# Patient Record
Sex: FEMALE | Race: WHITE | Employment: FULL TIME | URBAN - METROPOLITAN AREA
[De-identification: names, ages, dates, MRNs, and addresses within clinical notes are randomized per-mention and may not be internally consistent; named-entity substitution may affect disease eponyms.]

---

## 2021-05-21 ENCOUNTER — OFFICE VISIT (OUTPATIENT)
Dept: CARDIOLOGY CLINIC | Age: 41
End: 2021-05-21
Payer: COMMERCIAL

## 2021-05-21 VITALS
DIASTOLIC BLOOD PRESSURE: 82 MMHG | BODY MASS INDEX: 36.82 KG/M2 | SYSTOLIC BLOOD PRESSURE: 140 MMHG | OXYGEN SATURATION: 99 % | HEIGHT: 65 IN | HEART RATE: 68 BPM | WEIGHT: 221 LBS

## 2021-05-21 DIAGNOSIS — I44.7 LBBB (LEFT BUNDLE BRANCH BLOCK): Primary | ICD-10-CM

## 2021-05-21 DIAGNOSIS — R07.9 CHEST PAIN, UNSPECIFIED TYPE: ICD-10-CM

## 2021-05-21 DIAGNOSIS — I44.7 LBBB (LEFT BUNDLE BRANCH BLOCK): ICD-10-CM

## 2021-05-21 DIAGNOSIS — I10 ESSENTIAL HYPERTENSION: ICD-10-CM

## 2021-05-21 DIAGNOSIS — E66.9 CLASS 2 OBESITY WITHOUT SERIOUS COMORBIDITY WITH BODY MASS INDEX (BMI) OF 36.0 TO 36.9 IN ADULT, UNSPECIFIED OBESITY TYPE: ICD-10-CM

## 2021-05-21 PROCEDURE — 93000 ELECTROCARDIOGRAM COMPLETE: CPT | Performed by: INTERNAL MEDICINE

## 2021-05-21 PROCEDURE — 99204 OFFICE O/P NEW MOD 45 MIN: CPT | Performed by: INTERNAL MEDICINE

## 2021-05-21 RX ORDER — METFORMIN HYDROCHLORIDE 500 MG/1
500 TABLET ORAL 2 TIMES DAILY
COMMUNITY
Start: 2021-04-13

## 2021-05-21 RX ORDER — METOPROLOL SUCCINATE 50 MG/1
50 TABLET, EXTENDED RELEASE ORAL DAILY
COMMUNITY
Start: 2021-05-14

## 2021-05-21 RX ORDER — AMLODIPINE BESYLATE 5 MG/1
5 TABLET ORAL DAILY
COMMUNITY
Start: 2021-05-10

## 2021-05-21 RX ORDER — FLUOXETINE HYDROCHLORIDE 40 MG/1
40 CAPSULE ORAL DAILY
COMMUNITY
Start: 2021-04-14

## 2021-05-21 NOTE — PROGRESS NOTES
HISTORY OF PRESENT ILLNESS  Kylee Baugh is a 47 y.o. female. HPI    Patient presents for a new office visit. She was referred here from the emergency room for evaluation of a new left bundle branch block. 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 She has been complaining of increased headaches. She was also complaining of substernal chest pain and was noted to have a fast heart rate by her PCP, so she was sent directly to the emergency room approximately a week and a half ago. Her initial EKG at that time demonstrated sinus tachycardia with a left bundle branch block. Cardiac biomarkers were all normal.  Her blood pressure was elevated, so she was started on amlodipine and metoprolol. She was also taking phentermine at that time and was instructed to stop this medication. She was discharged home, but still was having intermittent chest discomfort the following day and became quite anxious, so she decided to seek medical attention at the emergency room at [facility] where she was again ruled out for a myocardial infarction and ultimately underwent a CTA of her coronary anatomy which was normal.  She had no evidence of epicardial coronary disease with a total calcium score of 0. She was ultimately discharged home with instructions for outpatient cardiology follow-up. Since her last emergency room visit 10 days ago, she states she has been feeling better. Her blood pressure has been averaging 073R to 445Z systolic at home. Her heart palpitations have improved as has her chest pain. She denies any leg swelling, no orthopnea, no PND.     Past Medical History:   Diagnosis Date    Anxiety     Diabetes ([facility] [address]     Essential hypertension     Left bundle branch block (LBBB) determined by electrocardiography 05/2021     Current Outpatient Medications   Medication Sig Dispense Refill    amLODIPine (NORVASC) 5 mg tablet Take 5 mg by mouth daily.  metoprolol succinate (TOPROL-XL) 50 mg XL tablet Take 50 mg by mouth daily.  metFORMIN (GLUCOPHAGE) 500 mg tablet Take 500 mg by mouth two (2) times a day. Take 1 tablet by mouth in the AM and 2 tablets by mouth in the PM      FLUoxetine (PROzac) 40 mg capsule Take 40 mg by mouth daily. Allergies   Allergen Reactions    Zithromax [Azithromycin] Hives      Social History     Tobacco Use    Smoking status: Never Smoker    Smokeless tobacco: Never Used   Substance Use Topics    Alcohol use: Not Currently    Drug use: Never     Family History   Problem Relation Age of Onset    Hypertension Mother     Atrial Fibrillation Mother     Diabetes Mother     Hypertension Father     Heart Surgery Father     Diabetes Father     High Cholesterol Father              Review of Systems   Constitutional: Negative for chills, fever and weight loss. HENT: Negative for nosebleeds. Eyes: Negative for blurred vision and double vision. Respiratory: Negative for cough, shortness of breath and wheezing. Cardiovascular: Positive for chest pain. Negative for palpitations, orthopnea, claudication, leg swelling and PND. Gastrointestinal: Negative for abdominal pain, heartburn, nausea and vomiting. Genitourinary: Negative for dysuria and hematuria. Musculoskeletal: Negative for falls and myalgias. Skin: Negative for rash. Neurological: Negative for dizziness, focal weakness and headaches. Endo/Heme/Allergies: Does not bruise/bleed easily. Psychiatric/Behavioral: Negative for substance abuse. Visit Vitals  BP (!) 140/82 (BP 1 Location: Right upper arm, BP Patient Position: Sitting, BP Cuff Size: Small adult)   Pulse 68   Ht 5' 5\" (1.651 m)   Wt 100.2 kg (221 lb)   SpO2 99%   BMI 36.78 kg/m²       Physical Exam  Constitutional:       Appearance: She is well-developed.    HENT:      Head: Normocephalic and atraumatic. Eyes:      Conjunctiva/sclera: Conjunctivae normal.   Neck:      Vascular: No carotid bruit or JVD. Cardiovascular:      Rate and Rhythm: Normal rate and regular rhythm. Pulses: Normal pulses. Heart sounds: S1 normal and S2 normal. No murmur heard. No gallop. Pulmonary:      Effort: Pulmonary effort is normal.      Breath sounds: Normal breath sounds. No wheezing or rales. Abdominal:      General: Bowel sounds are normal. There is no distension. Palpations: Abdomen is soft. Tenderness: There is no abdominal tenderness. Musculoskeletal:      Cervical back: Neck supple. Skin:     General: Skin is warm and dry. Neurological:      General: No focal deficit present. Mental Status: She is alert and oriented to person, place, and time. Motor: No weakness. Psychiatric:         Mood and Affect: Mood normal.         Thought Content: Thought content normal.       EKG: Normal sinus rhythm, left axis deviation, left bundle branch block. Compared to the previous EKG, the heart rate has decreased. Left bundle branch block appears to be unchanged. ASSESSMENT and PLAN    Left bundle branch block. Recent diagnosis earlier this month at a PCP office visit. She underwent a CTA of her coronaries which was negative for epicardial coronary disease in May 2021. She was ruled out for myocardial infarction at a recent emergency room visit and had unremarkable chest x-ray. I suspect this may just be a conduction issue, but I would like to evaluate for any structural heart disease with an echocardiogram.    Chest pain. This may have been related to poorly controlled hypertension and demand ischemia. This has since resolved with the addition of amlodipine and metoprolol XL to her regimen. She underwent a normal CTA of her coronaries earlier this month. No additional work-up for this needed. Essential hypertension.   Patient's blood pressure is mildly elevated today in our office. She was recently started on amlodipine and metoprolol XL. I would continue these medications for now and have advised her to adhere to a low-sodium diet and try and lose weight with lifestyle modification. If her blood pressure remains elevated, would consider adding an ARB next to her regimen. Borderline diabetes mellitus, type II. Patient has been started on Metformin recently. This is followed by her PCP. Obesity. Patient reports a 15 to 20 pound weight gain over the past 6 months. She was strongly encouraged to try and lose much weight as possible lifestyle modification. Follow-up in 3 months, sooner if needed.

## 2021-05-21 NOTE — PROGRESS NOTES
Karuna Garza presents today for   Chief Complaint   Patient presents with    New Patient     Self Ref for Elevated BP   [facility] Follow Up     Went to [facility] on 05-10-21 for LBBB and [facility] 05-11-21 to 05-12-21 for Chest Pain       Karuna Garza preferred language for health care discussion is english/other. Is someone accompanying this pt? no    Is the patient using any DME equipment during [address]? no    Depression Screening:  3 most recent PHQ Screens 5/21/2021   Little interest or pleasure in doing things Not at all   Feeling down, depressed, irritable, or hopeless Not at all   Total Score PHQ 2 0       Learning Assessment:  Learning Assessment 5/21/2021   PRIMARY LEARNER Patient   PRIMARY LANGUAGE ENGLISH   LEARNER PREFERENCE PRIMARY DEMONSTRATION   ANSWERED BY patient   RELATIONSHIP SELF       Abuse Screening:  Abuse Screening Questionnaire 5/21/2021   Do you ever feel afraid of your partner? N   Are you in a relationship with someone who physically or mentally threatens you? N   Is it safe for you to go home? Y         Pt currently taking Anticoagulant therapy? no    Pt currently taking Antiplatelet therapy ? no      Coordination of Care:  1. Have you been to the ER, urgent care clinic since your last visit? Hospitalized since your last visit? yes    2. Have you seen or consulted any other health care providers outside of the [address] [place] [address] since your last visit? Include any pap smears or colon screening.  no

## 2021-06-28 LAB
ECHO AO ROOT DIAM: 2.95 CM
ECHO LA AREA 4C: 17.08 CM2
ECHO LA VOL 2C: 52.02 ML (ref 22–52)
ECHO LA VOL 4C: 43.68 ML (ref 22–52)
ECHO LA VOL BP: 52.01 ML (ref 22–52)
ECHO LA VOL/BSA BIPLANE: 25.25 ML/M2 (ref 16–28)
ECHO LA VOLUME INDEX A2C: 25.25 ML/M2 (ref 16–28)
ECHO LA VOLUME INDEX A4C: 21.2 ML/M2 (ref 16–28)
ECHO LV E' LATERAL VELOCITY: 10.2 CM/S
ECHO LV E' SEPTAL VELOCITY: 7.89 CM/S
ECHO LV INTERNAL DIMENSION DIASTOLIC: 4.6 CM (ref 3.9–5.3)
ECHO LV INTERNAL DIMENSION SYSTOLIC: 3.27 CM
ECHO LV IVSD: 1.12 CM (ref 0.6–0.9)
ECHO LV MASS 2D: 180.1 G (ref 67–162)
ECHO LV MASS INDEX 2D: 87.4 G/M2 (ref 43–95)
ECHO LV POSTERIOR WALL DIASTOLIC: 1.07 CM (ref 0.6–0.9)
ECHO LVOT DIAM: 1.83 CM
ECHO LVOT PEAK GRADIENT: 6.09 MMHG
ECHO LVOT PEAK VELOCITY: 123.42 CM/S
ECHO LVOT SV: 66.2 ML
ECHO LVOT VTI: 25.2 CM
ECHO MV A VELOCITY: 68.4 CM/S
ECHO MV E DECELERATION TIME (DT): 216.88 MS
ECHO MV E VELOCITY: 82.64 CM/S
ECHO MV E/A RATIO: 1.21
ECHO MV E/E' LATERAL: 8.1
ECHO MV E/E' RATIO (AVERAGED): 9.29
ECHO MV E/E' SEPTAL: 10.47
LVOT MG: 4 MMHG

## 2021-06-29 NOTE — PROGRESS NOTES
Per your last note - Left bundle branch block. Recent diagnosis earlier this month at a PCP office visit. She underwent a CTA of her coronaries which was negative for epicardial coronary disease in May 2021. She was ruled out for myocardial infarction at a recent emergency room visit and had unremarkable chest x-ray.   I suspect this may just be a conduction issue, but I would like to evaluate for any structural heart disease with an echocardiogram.

## 2021-07-06 ENCOUNTER — TELEPHONE (OUTPATIENT)
Dept: CARDIOLOGY CLINIC | Age: 41
End: 2021-07-06

## 2021-07-06 NOTE — TELEPHONE ENCOUNTER
----- Message from Dominga Malave MD sent at 7/6/2021  9:05 AM EDT -----  Please let the patient know that her echocardiogram was normal.  ----- Message -----  From: Ronal Valdez LPN  Sent: 8/06/4687   4:11 PM EDT  To: Dominga Malave MD    Per your last note - Left bundle branch block. Recent diagnosis earlier this month at a PCP office visit. She underwent a CTA of her coronaries which was negative for epicardial coronary disease in May 2021. She was ruled out for myocardial infarction at a recent emergency room visit and had unremarkable chest x-ray.   I suspect this may just be a conduction issue, but I would like to evaluate for any structural heart disease with an echocardiogram.

## 2021-07-06 NOTE — TELEPHONE ENCOUNTER
Patient made aware of test results and Dr. Kaveh Barreto remarks. No questions or concerns at present.

## 2021-08-23 ENCOUNTER — OFFICE VISIT (OUTPATIENT)
Dept: CARDIOLOGY CLINIC | Age: 41
End: 2021-08-23
Payer: COMMERCIAL

## 2021-08-23 VITALS
HEART RATE: 91 BPM | SYSTOLIC BLOOD PRESSURE: 134 MMHG | HEIGHT: 65 IN | OXYGEN SATURATION: 98 % | DIASTOLIC BLOOD PRESSURE: 72 MMHG | WEIGHT: 231 LBS | BODY MASS INDEX: 38.49 KG/M2

## 2021-08-23 DIAGNOSIS — I10 ESSENTIAL HYPERTENSION: ICD-10-CM

## 2021-08-23 DIAGNOSIS — I44.7 LBBB (LEFT BUNDLE BRANCH BLOCK): Primary | ICD-10-CM

## 2021-08-23 PROCEDURE — 99214 OFFICE O/P EST MOD 30 MIN: CPT | Performed by: INTERNAL MEDICINE

## 2021-08-23 NOTE — PROGRESS NOTES
HISTORY OF PRESENT ILLNESS  Jas Longo is a 47 y.o. female. Follow-up  Pertinent negatives include no chest pain, no abdominal pain, no headaches and no shortness of breath. Patient presents for a follow-up office visit. She was initially referred here from the emergency room for evaluation of a new left bundle branch block. 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 She has been complaining of increased headaches. She was also complaining of substernal chest pain and was noted to have a fast heart rate by her PCP, so she was sent directly to the emergency room approximately a week and a half ago. Her initial EKG at that time demonstrated sinus tachycardia with a left bundle branch block. Cardiac biomarkers were all normal.  Her blood pressure was elevated, so she was started on amlodipine and metoprolol. She was also taking phentermine at that time and was instructed to stop this medication. She was discharged home, but still was having intermittent chest discomfort the following day and became quite anxious, so she decided to seek medical attention at the emergency room at [facility] where she was again ruled out for a myocardial infarction and ultimately underwent a CTA of her coronary anatomy which was normal.  She had no evidence of epicardial coronary disease with a total calcium score of 0. She was ultimately discharged home with instructions for outpatient cardiology follow-up. The patient was last seen in our office 3 months ago. She underwent an echocardiogram in June 2021 which is essentially normal.  EF 55 to 60%, no valvular heart disease. Since last visit, she has been feeling well. No chest pain, shortness of breath, leg swelling, orthopnea or PND.   She does check her blood pressure at home and systolic readings are typically in the 937L 454O and diastolic readings in the 53C and 80s. Past Medical History:   Diagnosis Date    Anxiety     Diabetes ([facility] [address]     Essential hypertension     Left bundle branch block (LBBB) determined by electrocardiography 05/2021     Current Outpatient Medications   Medication Sig Dispense Refill    amLODIPine (NORVASC) 5 mg tablet Take 5 mg by mouth daily.  metoprolol succinate (TOPROL-XL) 50 mg XL tablet Take 50 mg by mouth daily.  metFORMIN (GLUCOPHAGE) 500 mg tablet Take 500 mg by mouth two (2) times a day. Take 1 tablet by mouth in the AM and 2 tablets by mouth in the PM      FLUoxetine (PROzac) 40 mg capsule Take 40 mg by mouth daily. Allergies   Allergen Reactions    Zithromax [Azithromycin] Hives      Social History     Tobacco Use    Smoking status: Never Smoker    Smokeless tobacco: Never Used   Substance Use Topics    Alcohol use: Not Currently    Drug use: Never     Family History   Problem Relation Age of Onset    Hypertension Mother     Atrial Fibrillation Mother     Diabetes Mother     Hypertension Father     Heart Surgery Father     Diabetes Father     High Cholesterol Father              Review of Systems   Constitutional: Negative for chills, fever and weight loss. HENT: Negative for nosebleeds. Eyes: Negative for blurred vision and double vision. Respiratory: Negative for cough, shortness of breath and wheezing. Cardiovascular: Negative for chest pain, palpitations, orthopnea, claudication, leg swelling and PND. Gastrointestinal: Negative for abdominal pain, heartburn, nausea and vomiting. Genitourinary: Negative for dysuria and hematuria. Musculoskeletal: Negative for falls and myalgias. Skin: Negative for rash. Neurological: Negative for dizziness, focal weakness and headaches. Endo/Heme/Allergies: Does not bruise/bleed easily. Psychiatric/Behavioral: Negative for substance abuse.      Visit Vitals  /72 (BP 1 Location: Left upper arm, BP Patient Position: Sitting, BP Cuff Size: Adult)   Pulse 91   Ht 5' 5\" (1.651 m)   Wt 104.8 kg (231 lb)   SpO2 98%   BMI 38.44 kg/m²       Physical Exam  Constitutional:       Appearance: She is well-developed. HENT:      Head: Normocephalic and atraumatic. Eyes:      Conjunctiva/sclera: Conjunctivae normal.   Neck:      Vascular: No carotid bruit or JVD. Cardiovascular:      Rate and Rhythm: Normal rate and regular rhythm. Pulses: Normal pulses. Heart sounds: S1 normal and S2 normal. No murmur heard. No gallop. Pulmonary:      Effort: Pulmonary effort is normal.      Breath sounds: Normal breath sounds. No wheezing or rales. Abdominal:      General: Bowel sounds are normal. There is no distension. Palpations: Abdomen is soft. Tenderness: There is no abdominal tenderness. Musculoskeletal:         General: No swelling or deformity. Cervical back: Neck supple. Skin:     General: Skin is warm and dry. Neurological:      General: No focal deficit present. Mental Status: She is alert and oriented to person, place, and time. Motor: No weakness. Psychiatric:         Mood and Affect: Mood normal.         Thought Content: Thought content normal.         ASSESSMENT and PLAN    Left bundle branch block. Recent diagnosis in May 2021 at her PCPs office. No evidence of structural heart disease on echocardiogram.  This is likely just electrical conduction phenomenon. She has no symptoms of advanced heart block. No additional work-up needed at this time. Essential hypertension. Patient's blood pressure now appears to be well controlled on her current medical regimen which currently includes metoprolol XL and amlodipine. This can be followed by her PCP. Borderline diabetes mellitus, type II. Patient has been started on Metformin recently. This is followed by her PCP. Obesity.   Patient has gained at least 20+ pounds in weight over the past year. She was strongly encouraged to try and lose much weight as possible lifestyle modification. Patient can follow-up in the future as needed.

## 2021-08-23 NOTE — PROGRESS NOTES
Daryl Quinones presents today for   Chief Complaint   Patient presents with    Follow-up     3 month follow up        Daryl Quinones preferred language for health care discussion is english/other. Is someone accompanying this pt? no    Is the patient using any DME equipment during [address]? no    Depression Screening:  3 most recent PHQ Screens 8/23/2021   Little interest or pleasure in doing things Not at all   Feeling down, depressed, irritable, or hopeless Not at all   Total Score PHQ 2 0       Learning Assessment:  Learning Assessment 5/21/2021   PRIMARY LEARNER Patient   PRIMARY LANGUAGE ENGLISH   LEARNER PREFERENCE PRIMARY DEMONSTRATION   ANSWERED BY patient   RELATIONSHIP SELF       Abuse Screening:  Abuse Screening Questionnaire 5/21/2021   Do you ever feel afraid of your partner? N   Are you in a relationship with someone who physically or mentally threatens you? N   Is it safe for you to go home? Y     Pt currently taking Anticoagulant therapy? no    Coordination of Care:  1. Have you been to the ER, urgent care clinic since your last visit? Hospitalized since your last visit? no    2. Have you seen or consulted any other health care providers outside of the [address] [place] [address] since your last visit? Include any pap smears or colon screening.  no

## 2022-03-18 PROBLEM — I44.7 LBBB (LEFT BUNDLE BRANCH BLOCK): Status: ACTIVE | Noted: 2021-05-21

## 2022-03-19 PROBLEM — E66.812 CLASS 2 OBESITY WITHOUT SERIOUS COMORBIDITY WITH BODY MASS INDEX (BMI) OF 36.0 TO 36.9 IN ADULT: Status: ACTIVE | Noted: 2021-05-21

## 2022-03-19 PROBLEM — E66.9 CLASS 2 OBESITY WITHOUT SERIOUS COMORBIDITY WITH BODY MASS INDEX (BMI) OF 36.0 TO 36.9 IN ADULT: Status: ACTIVE | Noted: 2021-05-21

## 2022-03-20 PROBLEM — I44.7 LEFT BUNDLE BRANCH BLOCK (LBBB) DETERMINED BY ELECTROCARDIOGRAPHY: Status: ACTIVE | Noted: 2021-05-01

## 2022-03-20 PROBLEM — I10 ESSENTIAL HYPERTENSION: Status: ACTIVE | Noted: 2021-05-21

## 2023-07-18 ENCOUNTER — OFFICE VISIT (OUTPATIENT)
Age: 43
End: 2023-07-18
Payer: COMMERCIAL

## 2023-07-18 VITALS
HEART RATE: 85 BPM | HEIGHT: 65 IN | DIASTOLIC BLOOD PRESSURE: 84 MMHG | WEIGHT: 248 LBS | OXYGEN SATURATION: 99 % | BODY MASS INDEX: 41.32 KG/M2 | SYSTOLIC BLOOD PRESSURE: 134 MMHG

## 2023-07-18 DIAGNOSIS — I44.7 LEFT BUNDLE-BRANCH BLOCK, UNSPECIFIED: ICD-10-CM

## 2023-07-18 DIAGNOSIS — I10 ESSENTIAL (PRIMARY) HYPERTENSION: Primary | ICD-10-CM

## 2023-07-18 DIAGNOSIS — E66.9 OBESITY, UNSPECIFIED CLASSIFICATION, UNSPECIFIED OBESITY TYPE, UNSPECIFIED WHETHER SERIOUS COMORBIDITY PRESENT: ICD-10-CM

## 2023-07-18 DIAGNOSIS — E11.9 TYPE 2 DIABETES MELLITUS WITHOUT COMPLICATION, WITHOUT LONG-TERM CURRENT USE OF INSULIN (HCC): ICD-10-CM

## 2023-07-18 PROBLEM — R07.9 CHEST PAIN: Status: ACTIVE | Noted: 2021-05-11

## 2023-07-18 PROBLEM — F33.42 RECURRENT MAJOR DEPRESSIVE DISORDER, IN FULL REMISSION (HCC): Status: ACTIVE | Noted: 2021-07-29

## 2023-07-18 PROBLEM — R73.03 PREDIABETES: Status: ACTIVE | Noted: 2021-07-29

## 2023-07-18 PROCEDURE — 99214 OFFICE O/P EST MOD 30 MIN: CPT | Performed by: INTERNAL MEDICINE

## 2023-07-18 PROCEDURE — 3075F SYST BP GE 130 - 139MM HG: CPT | Performed by: INTERNAL MEDICINE

## 2023-07-18 PROCEDURE — 93000 ELECTROCARDIOGRAM COMPLETE: CPT | Performed by: INTERNAL MEDICINE

## 2023-07-18 PROCEDURE — 3079F DIAST BP 80-89 MM HG: CPT | Performed by: INTERNAL MEDICINE

## 2023-07-18 RX ORDER — LISINOPRIL 5 MG/1
5 TABLET ORAL DAILY
COMMUNITY
End: 2023-07-18

## 2023-07-18 RX ORDER — LISINOPRIL 10 MG/1
10 TABLET ORAL DAILY
Qty: 90 TABLET | Refills: 3 | Status: SHIPPED | OUTPATIENT
Start: 2023-07-18

## 2023-07-18 ASSESSMENT — PATIENT HEALTH QUESTIONNAIRE - PHQ9
3. TROUBLE FALLING OR STAYING ASLEEP: 0
4. FEELING TIRED OR HAVING LITTLE ENERGY: 0
5. POOR APPETITE OR OVEREATING: 0
SUM OF ALL RESPONSES TO PHQ QUESTIONS 1-9: 0
SUM OF ALL RESPONSES TO PHQ9 QUESTIONS 1 & 2: 0
8. MOVING OR SPEAKING SO SLOWLY THAT OTHER PEOPLE COULD HAVE NOTICED. OR THE OPPOSITE, BEING SO FIGETY OR RESTLESS THAT YOU HAVE BEEN MOVING AROUND A LOT MORE THAN USUAL: 0
SUM OF ALL RESPONSES TO PHQ QUESTIONS 1-9: 0
SUM OF ALL RESPONSES TO PHQ QUESTIONS 1-9: 0
2. FEELING DOWN, DEPRESSED OR HOPELESS: 0
9. THOUGHTS THAT YOU WOULD BE BETTER OFF DEAD, OR OF HURTING YOURSELF: 0
6. FEELING BAD ABOUT YOURSELF - OR THAT YOU ARE A FAILURE OR HAVE LET YOURSELF OR YOUR FAMILY DOWN: 0
SUM OF ALL RESPONSES TO PHQ QUESTIONS 1-9: 0
1. LITTLE INTEREST OR PLEASURE IN DOING THINGS: 0
10. IF YOU CHECKED OFF ANY PROBLEMS, HOW DIFFICULT HAVE THESE PROBLEMS MADE IT FOR YOU TO DO YOUR WORK, TAKE CARE OF THINGS AT HOME, OR GET ALONG WITH OTHER PEOPLE: 0
7. TROUBLE CONCENTRATING ON THINGS, SUCH AS READING THE NEWSPAPER OR WATCHING TELEVISION: 0

## 2023-07-18 ASSESSMENT — ENCOUNTER SYMPTOMS
ABDOMINAL PAIN: 0
SHORTNESS OF BREATH: 0
SORE THROAT: 0
VOMITING: 0
COUGH: 0
ABDOMINAL DISTENTION: 0
NAUSEA: 0

## 2023-07-18 NOTE — PROGRESS NOTES
07/18/23     Cleveland Sahu  is a 38 y.o. female     Chief Complaint   Patient presents with    Follow-up     Last seen on 08-  C/O Hypertension and Headaches       HPI    Patient presents for an add-on office visit. She was initially referred here from the emergency room for evaluation of a new left bundle branch block. 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 She has been complaining of increased headaches. She was also complaining of substernal chest pain and was noted to have a fast heart rate by her PCP, so she was sent directly to the emergency room approximately a week and a half ago. Her initial EKG at that time demonstrated sinus tachycardia with a left bundle branch block. Cardiac biomarkers were all normal.  Her blood pressure was elevated, so she was started on amlodipine and metoprolol. She was also taking phentermine at that time and was instructed to stop this medication. She was discharged home, but still was having intermittent chest discomfort the following day and became quite anxious, so she decided to seek medical attention at the emergency room at [facility] where she was again ruled out for a myocardial infarction and ultimately underwent a CTA of her coronary anatomy which was normal.  She had no evidence of epicardial coronary disease with a total calcium score of 0. She then underwent an echocardiogram in June 2021 which is essentially normal.  EF 55 to 60%, no valvular heart disease. Patient has not been seen in our office in nearly 2 years. She returns today complaining of intermittent chest pressure in the center of her chest which usually last for 2 minutes at most in duration.   She was having issues with significant elevation in her blood pressure at the end of last month and as result her PCP started on

## 2023-07-18 NOTE — PROGRESS NOTES
Celia Waters presents today for   Chief Complaint   Patient presents with    Follow-up     Last seen on 08-  C/O Hypertension and Headaches       Celia Waters preferred language for health care discussion is english/other. Is someone accompanying this pt? no    Is the patient using any DME equipment during OV? no    Depression Screening:  Depression: Not at risk    PHQ-2 Score: 0        Learning Assessment:  Who is the primary learner? Patient    What is the preferred language for health care of the primary learner? ENGLISH    How does the primary learner prefer to learn new concepts? DEMONSTRATION    Answered By patient    Relationship to Learner SELF           Pt currently taking Anticoagulant therapy? no    Pt currently taking Antiplatelet therapy ? no      Coordination of Care:  1. Have you been to the ER, urgent care clinic since your last visit? Hospitalized since your last visit? no    2. Have you seen or consulted any other health care providers outside of the [address] [place] [address] since your last visit? Include any pap smears or colon screening.  no

## 2023-07-21 ENCOUNTER — HOSPITAL ENCOUNTER (OUTPATIENT)
Facility: HOSPITAL | Age: 43
Discharge: HOME | End: 2023-07-21
Payer: COMMERCIAL

## 2023-07-21 DIAGNOSIS — Z12.31 SCREENING MAMMOGRAM FOR BREAST CANCER: ICD-10-CM

## 2023-07-21 PROCEDURE — 77063 BREAST TOMOSYNTHESIS BI: CPT

## 2023-07-21 RX ORDER — AMLODIPINE BESYLATE 5 MG/1
5 TABLET ORAL DAILY
Qty: 90 TABLET | Refills: 3 | Status: SHIPPED | OUTPATIENT
Start: 2023-07-21

## 2023-10-26 RX ORDER — METOPROLOL SUCCINATE 50 MG/1
50 TABLET, EXTENDED RELEASE ORAL DAILY
Qty: 90 TABLET | Refills: 3 | Status: SHIPPED | OUTPATIENT
Start: 2023-10-26

## 2024-05-23 ENCOUNTER — OFFICE VISIT (OUTPATIENT)
Age: 44
End: 2024-05-23
Payer: COMMERCIAL

## 2024-05-23 VITALS
DIASTOLIC BLOOD PRESSURE: 78 MMHG | BODY MASS INDEX: 34.49 KG/M2 | HEIGHT: 65 IN | OXYGEN SATURATION: 98 % | WEIGHT: 207 LBS | HEART RATE: 73 BPM | SYSTOLIC BLOOD PRESSURE: 120 MMHG

## 2024-05-23 DIAGNOSIS — E11.9 TYPE 2 DIABETES MELLITUS WITHOUT COMPLICATION, WITHOUT LONG-TERM CURRENT USE OF INSULIN (HCC): ICD-10-CM

## 2024-05-23 DIAGNOSIS — I44.7 LEFT BUNDLE-BRANCH BLOCK, UNSPECIFIED: Primary | ICD-10-CM

## 2024-05-23 DIAGNOSIS — E66.9 OBESITY, UNSPECIFIED CLASSIFICATION, UNSPECIFIED OBESITY TYPE, UNSPECIFIED WHETHER SERIOUS COMORBIDITY PRESENT: ICD-10-CM

## 2024-05-23 DIAGNOSIS — I10 ESSENTIAL (PRIMARY) HYPERTENSION: ICD-10-CM

## 2024-05-23 PROCEDURE — 93000 ELECTROCARDIOGRAM COMPLETE: CPT | Performed by: INTERNAL MEDICINE

## 2024-05-23 PROCEDURE — 99214 OFFICE O/P EST MOD 30 MIN: CPT | Performed by: INTERNAL MEDICINE

## 2024-05-23 PROCEDURE — 3078F DIAST BP <80 MM HG: CPT | Performed by: INTERNAL MEDICINE

## 2024-05-23 PROCEDURE — 3074F SYST BP LT 130 MM HG: CPT | Performed by: INTERNAL MEDICINE

## 2024-05-23 RX ORDER — SEMAGLUTIDE 1.34 MG/ML
INJECTION, SOLUTION SUBCUTANEOUS
COMMUNITY

## 2024-05-23 RX ORDER — FLUOXETINE 20 MG/5ML
40 SOLUTION ORAL DAILY
COMMUNITY

## 2024-05-23 ASSESSMENT — PATIENT HEALTH QUESTIONNAIRE - PHQ9
8. MOVING OR SPEAKING SO SLOWLY THAT OTHER PEOPLE COULD HAVE NOTICED. OR THE OPPOSITE, BEING SO FIGETY OR RESTLESS THAT YOU HAVE BEEN MOVING AROUND A LOT MORE THAN USUAL: NOT AT ALL
SUM OF ALL RESPONSES TO PHQ QUESTIONS 1-9: 0
2. FEELING DOWN, DEPRESSED OR HOPELESS: NOT AT ALL
SUM OF ALL RESPONSES TO PHQ9 QUESTIONS 1 & 2: 0
10. IF YOU CHECKED OFF ANY PROBLEMS, HOW DIFFICULT HAVE THESE PROBLEMS MADE IT FOR YOU TO DO YOUR WORK, TAKE CARE OF THINGS AT HOME, OR GET ALONG WITH OTHER PEOPLE: NOT DIFFICULT AT ALL
6. FEELING BAD ABOUT YOURSELF - OR THAT YOU ARE A FAILURE OR HAVE LET YOURSELF OR YOUR FAMILY DOWN: NOT AT ALL
9. THOUGHTS THAT YOU WOULD BE BETTER OFF DEAD, OR OF HURTING YOURSELF: NOT AT ALL
SUM OF ALL RESPONSES TO PHQ QUESTIONS 1-9: 0
7. TROUBLE CONCENTRATING ON THINGS, SUCH AS READING THE NEWSPAPER OR WATCHING TELEVISION: NOT AT ALL
SUM OF ALL RESPONSES TO PHQ QUESTIONS 1-9: 0
SUM OF ALL RESPONSES TO PHQ QUESTIONS 1-9: 0
1. LITTLE INTEREST OR PLEASURE IN DOING THINGS: NOT AT ALL
5. POOR APPETITE OR OVEREATING: NOT AT ALL
4. FEELING TIRED OR HAVING LITTLE ENERGY: NOT AT ALL
3. TROUBLE FALLING OR STAYING ASLEEP: NOT AT ALL

## 2024-05-23 ASSESSMENT — ENCOUNTER SYMPTOMS
SORE THROAT: 0
ABDOMINAL PAIN: 0
SHORTNESS OF BREATH: 0
ABDOMINAL DISTENTION: 0
VOMITING: 0
NAUSEA: 0
COUGH: 0

## 2024-05-23 ASSESSMENT — ANXIETY QUESTIONNAIRES
4. TROUBLE RELAXING: NOT AT ALL
3. WORRYING TOO MUCH ABOUT DIFFERENT THINGS: NOT AT ALL
IF YOU CHECKED OFF ANY PROBLEMS ON THIS QUESTIONNAIRE, HOW DIFFICULT HAVE THESE PROBLEMS MADE IT FOR YOU TO DO YOUR WORK, TAKE CARE OF THINGS AT HOME, OR GET ALONG WITH OTHER PEOPLE: NOT DIFFICULT AT ALL
2. NOT BEING ABLE TO STOP OR CONTROL WORRYING: NOT AT ALL
7. FEELING AFRAID AS IF SOMETHING AWFUL MIGHT HAPPEN: NOT AT ALL
GAD7 TOTAL SCORE: 0
1. FEELING NERVOUS, ANXIOUS, OR ON EDGE: NOT AT ALL
6. BECOMING EASILY ANNOYED OR IRRITABLE: NOT AT ALL
5. BEING SO RESTLESS THAT IT IS HARD TO SIT STILL: NOT AT ALL

## 2024-05-23 NOTE — PROGRESS NOTES
Ly Beard presents today for   Chief Complaint   Patient presents with    Follow-up     9 months        Ly Beard preferred language for health care discussion is english/other.    Is someone accompanying this pt? no    Is the patient using any DME equipment during OV? no    Depression Screening:  Depression: Not at risk (5/23/2024)    PHQ-2     PHQ-2 Score: 0        Learning Assessment:  Who is the primary learner? Patient    What is the preferred language for health care of the primary learner? ENGLISH    How does the primary learner prefer to learn new concepts? DEMONSTRATION    Answered By patient    Relationship to Learner SELF           Pt currently taking Anticoagulant therapy? no    Pt currently taking Antiplatelet therapy ? no      Coordination of Care:  1. Have you been to the ER, urgent care clinic since your last visit? Hospitalized since your last visit? no    2. Have you seen or consulted any other health care providers outside of the [facility] since your last visit? Include any pap smears or colon screening. no

## 2024-05-23 NOTE — PROGRESS NOTES
05/23/24     Ly Beard  is a 43 y.o. female     Chief Complaint   Patient presents with    Follow-up     9 months        HPI    Patient presents for a follow-up office visit.  She was initially referred here from the emergency room for evaluation of a new left bundle branch block.  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  She has been complaining of increased headaches.  She was also complaining of substernal chest pain and was noted to have a fast heart rate by her PCP, so she was sent directly to the emergency room approximately a week and a half ago.  Her initial EKG at that time demonstrated sinus tachycardia with a left bundle branch block.  Cardiac biomarkers were all normal.  Her blood pressure was elevated, so she was started on amlodipine and metoprolol.  She was also taking phentermine at that time and was instructed to stop this medication.  She was discharged home, but still was having intermittent chest discomfort the following day and became quite anxious, so she decided to seek medical attention at the emergency room at [facility] where she was again ruled out for a myocardial infarction and ultimately underwent a CTA of her coronary anatomy which was normal.  She had no evidence of epicardial coronary disease with a total calcium score of 0.  She then underwent an echocardiogram in June 2021 which is essentially normal.  EF 55-60%, no valvular heart disease.      She was last seen in our office approximately 9 months ago.  Since last visit, she was started on Ozempic and has lost at least 40 pounds in weight.  She is feeling much better.  She feels her activity tolerance has improved.  She admits that she does not exercise regularly but she is very active throughout the day playing with her daughter.  She has not had any chest pain,

## 2024-07-22 RX ORDER — AMLODIPINE BESYLATE 5 MG/1
5 TABLET ORAL DAILY
Qty: 90 TABLET | Refills: 3 | Status: SHIPPED | OUTPATIENT
Start: 2024-07-22

## 2024-07-22 RX ORDER — LISINOPRIL 10 MG/1
10 TABLET ORAL DAILY
Qty: 90 TABLET | Refills: 3 | Status: SHIPPED | OUTPATIENT
Start: 2024-07-22

## 2024-08-07 ENCOUNTER — HOSPITAL ENCOUNTER (OUTPATIENT)
Facility: HOSPITAL | Age: 44
Discharge: HOME | End: 2024-08-10
Payer: COMMERCIAL

## 2024-08-07 VITALS — HEIGHT: 65 IN | BODY MASS INDEX: 34.66 KG/M2 | WEIGHT: 208 LBS

## 2024-08-07 DIAGNOSIS — Z12.31 SCREENING MAMMOGRAM FOR BREAST CANCER: ICD-10-CM

## 2024-08-07 PROCEDURE — 77063 BREAST TOMOSYNTHESIS BI: CPT

## 2024-11-13 RX ORDER — LISINOPRIL 10 MG/1
10 TABLET ORAL DAILY
Qty: 90 TABLET | Refills: 3 | Status: SHIPPED | OUTPATIENT
Start: 2024-11-13

## 2024-11-13 RX ORDER — AMLODIPINE BESYLATE 5 MG/1
5 TABLET ORAL DAILY
Qty: 90 TABLET | Refills: 3 | Status: SHIPPED | OUTPATIENT
Start: 2024-11-13

## 2024-11-13 RX ORDER — METOPROLOL SUCCINATE 50 MG/1
50 TABLET, EXTENDED RELEASE ORAL DAILY
Qty: 90 TABLET | Refills: 3 | Status: SHIPPED | OUTPATIENT
Start: 2024-11-13

## 2025-04-29 ENCOUNTER — TELEPHONE (OUTPATIENT)
Age: 45
End: 2025-04-29

## 2025-08-08 ENCOUNTER — HOSPITAL ENCOUNTER (OUTPATIENT)
Facility: HOSPITAL | Age: 45
Discharge: HOME | End: 2025-08-11
Payer: COMMERCIAL

## 2025-08-08 VITALS — BODY MASS INDEX: 34.66 KG/M2 | HEIGHT: 65 IN | WEIGHT: 208 LBS

## 2025-08-08 DIAGNOSIS — Z12.31 ENCOUNTER FOR SCREENING MAMMOGRAM FOR MALIGNANT NEOPLASM OF BREAST: ICD-10-CM

## 2025-08-08 PROCEDURE — 77063 BREAST TOMOSYNTHESIS BI: CPT
